# Patient Record
Sex: MALE | Race: WHITE | HISPANIC OR LATINO | Employment: FULL TIME | URBAN - METROPOLITAN AREA
[De-identification: names, ages, dates, MRNs, and addresses within clinical notes are randomized per-mention and may not be internally consistent; named-entity substitution may affect disease eponyms.]

---

## 2017-04-18 ENCOUNTER — ALLSCRIPTS OFFICE VISIT (OUTPATIENT)
Dept: OTHER | Facility: OTHER | Age: 57
End: 2017-04-18

## 2017-10-12 ENCOUNTER — GENERIC CONVERSION - ENCOUNTER (OUTPATIENT)
Dept: OTHER | Facility: OTHER | Age: 57
End: 2017-10-12

## 2018-01-22 VITALS
HEART RATE: 64 BPM | BODY MASS INDEX: 26.68 KG/M2 | DIASTOLIC BLOOD PRESSURE: 68 MMHG | SYSTOLIC BLOOD PRESSURE: 110 MMHG | WEIGHT: 197 LBS | HEIGHT: 72 IN

## 2018-01-22 VITALS
BODY MASS INDEX: 27.92 KG/M2 | SYSTOLIC BLOOD PRESSURE: 138 MMHG | HEIGHT: 70 IN | HEART RATE: 60 BPM | DIASTOLIC BLOOD PRESSURE: 84 MMHG | WEIGHT: 195 LBS

## 2018-04-12 ENCOUNTER — OFFICE VISIT (OUTPATIENT)
Dept: FAMILY MEDICINE CLINIC | Facility: CLINIC | Age: 58
End: 2018-04-12
Payer: COMMERCIAL

## 2018-04-12 VITALS
DIASTOLIC BLOOD PRESSURE: 86 MMHG | HEIGHT: 72 IN | HEART RATE: 83 BPM | WEIGHT: 199 LBS | BODY MASS INDEX: 26.95 KG/M2 | SYSTOLIC BLOOD PRESSURE: 126 MMHG

## 2018-04-12 DIAGNOSIS — Z02.89 ENCOUNTER FOR FEDERAL AVIATION ADMINISTRATION (FAA) EXAMINATION: Primary | ICD-10-CM

## 2018-04-12 PROCEDURE — 99499 UNLISTED E&M SERVICE: CPT | Performed by: FAMILY MEDICINE

## 2018-04-12 PROCEDURE — 93000 ELECTROCARDIOGRAM COMPLETE: CPT | Performed by: FAMILY MEDICINE

## 2018-04-12 PROCEDURE — 3008F BODY MASS INDEX DOCD: CPT | Performed by: FAMILY MEDICINE

## 2018-10-25 ENCOUNTER — OFFICE VISIT (OUTPATIENT)
Dept: FAMILY MEDICINE CLINIC | Facility: CLINIC | Age: 58
End: 2018-10-25
Payer: COMMERCIAL

## 2018-10-25 VITALS
SYSTOLIC BLOOD PRESSURE: 140 MMHG | BODY MASS INDEX: 26.11 KG/M2 | DIASTOLIC BLOOD PRESSURE: 90 MMHG | HEIGHT: 72 IN | HEART RATE: 69 BPM | WEIGHT: 192.8 LBS

## 2018-10-25 DIAGNOSIS — Z02.89 ENCOUNTER FOR FEDERAL AVIATION ADMINISTRATION (FAA) EXAMINATION: Primary | ICD-10-CM

## 2018-10-25 PROCEDURE — 3008F BODY MASS INDEX DOCD: CPT | Performed by: FAMILY MEDICINE

## 2018-10-25 PROCEDURE — 99499 UNLISTED E&M SERVICE: CPT | Performed by: FAMILY MEDICINE

## 2018-10-25 NOTE — PROGRESS NOTES
Chief Complaint   Patient presents with    Well Check     FAA 1st class, No ekg, No meds   Vision: 20/20 distance/intermediate, 20/30 near, PASS c/v  C#: [id number]

## 2019-04-11 ENCOUNTER — OFFICE VISIT (OUTPATIENT)
Dept: FAMILY MEDICINE CLINIC | Facility: CLINIC | Age: 59
End: 2019-04-11

## 2019-04-11 VITALS
HEIGHT: 72 IN | DIASTOLIC BLOOD PRESSURE: 82 MMHG | SYSTOLIC BLOOD PRESSURE: 138 MMHG | WEIGHT: 195 LBS | HEART RATE: 61 BPM | BODY MASS INDEX: 26.41 KG/M2

## 2019-04-11 DIAGNOSIS — Z02.89 ENCOUNTER FOR FEDERAL AVIATION ADMINISTRATION (FAA) EXAMINATION: Primary | ICD-10-CM

## 2019-04-11 PROCEDURE — 99499 UNLISTED E&M SERVICE: CPT | Performed by: FAMILY MEDICINE

## 2019-04-11 PROCEDURE — 93000 ELECTROCARDIOGRAM COMPLETE: CPT | Performed by: FAMILY MEDICINE

## 2019-10-23 ENCOUNTER — OFFICE VISIT (OUTPATIENT)
Dept: FAMILY MEDICINE CLINIC | Facility: CLINIC | Age: 59
End: 2019-10-23

## 2019-10-23 VITALS
HEART RATE: 73 BPM | HEIGHT: 72 IN | DIASTOLIC BLOOD PRESSURE: 70 MMHG | SYSTOLIC BLOOD PRESSURE: 112 MMHG | BODY MASS INDEX: 26.68 KG/M2 | WEIGHT: 197 LBS

## 2019-10-23 DIAGNOSIS — Z02.89 ENCOUNTER FOR FEDERAL AVIATION ADMINISTRATION (FAA) EXAMINATION: Primary | ICD-10-CM

## 2019-10-23 PROCEDURE — 99499 UNLISTED E&M SERVICE: CPT | Performed by: FAMILY MEDICINE

## 2020-06-10 ENCOUNTER — OFFICE VISIT (OUTPATIENT)
Dept: FAMILY MEDICINE CLINIC | Facility: CLINIC | Age: 60
End: 2020-06-10
Payer: COMMERCIAL

## 2020-06-10 VITALS
BODY MASS INDEX: 26.68 KG/M2 | WEIGHT: 197 LBS | HEIGHT: 72 IN | SYSTOLIC BLOOD PRESSURE: 120 MMHG | DIASTOLIC BLOOD PRESSURE: 80 MMHG | HEART RATE: 68 BPM

## 2020-06-10 DIAGNOSIS — Z02.89 ENCOUNTER FOR FEDERAL AVIATION ADMINISTRATION (FAA) EXAMINATION: Primary | ICD-10-CM

## 2020-06-10 PROCEDURE — 3008F BODY MASS INDEX DOCD: CPT | Performed by: FAMILY MEDICINE

## 2020-06-10 PROCEDURE — 99499 UNLISTED E&M SERVICE: CPT | Performed by: FAMILY MEDICINE

## 2020-06-10 PROCEDURE — 93000 ELECTROCARDIOGRAM COMPLETE: CPT | Performed by: FAMILY MEDICINE

## 2021-02-24 ENCOUNTER — OFFICE VISIT (OUTPATIENT)
Dept: FAMILY MEDICINE CLINIC | Facility: CLINIC | Age: 61
End: 2021-02-24

## 2021-02-24 VITALS
BODY MASS INDEX: 26.82 KG/M2 | HEIGHT: 72 IN | HEART RATE: 87 BPM | SYSTOLIC BLOOD PRESSURE: 138 MMHG | WEIGHT: 198 LBS | DIASTOLIC BLOOD PRESSURE: 80 MMHG

## 2021-02-24 DIAGNOSIS — Z02.89 ENCOUNTER FOR FEDERAL AVIATION ADMINISTRATION (FAA) EXAMINATION: Primary | ICD-10-CM

## 2021-02-24 PROCEDURE — 99499 UNLISTED E&M SERVICE: CPT | Performed by: FAMILY MEDICINE

## 2021-02-24 NOTE — PROGRESS NOTES
Chief Complaint   Patient presents with   Lillian Pozo Physical Exam     1st [address] # [id number]  ekg, no letter   no correctives

## 2021-08-10 ENCOUNTER — OFFICE VISIT (OUTPATIENT)
Dept: FAMILY MEDICINE CLINIC | Facility: CLINIC | Age: 61
End: 2021-08-10

## 2021-08-10 VITALS
HEART RATE: 65 BPM | BODY MASS INDEX: 26.01 KG/M2 | WEIGHT: 192 LBS | HEIGHT: 72 IN | SYSTOLIC BLOOD PRESSURE: 120 MMHG | DIASTOLIC BLOOD PRESSURE: 80 MMHG

## 2021-08-10 DIAGNOSIS — Z02.89 ENCOUNTER FOR FEDERAL AVIATION ADMINISTRATION (FAA) EXAMINATION: Primary | ICD-10-CM

## 2021-08-10 PROCEDURE — 93000 ELECTROCARDIOGRAM COMPLETE: CPT | Performed by: FAMILY MEDICINE

## 2021-08-10 PROCEDURE — 99499 UNLISTED E&M SERVICE: CPT | Performed by: FAMILY MEDICINE

## 2021-08-10 NOTE — PROGRESS NOTES
Chief Complaint   Patient presents with    Physical Exam     FAA exam- 1st class ekg correctives? no letter

## 2022-02-21 ENCOUNTER — OFFICE VISIT (OUTPATIENT)
Dept: FAMILY MEDICINE CLINIC | Facility: CLINIC | Age: 62
End: 2022-02-21

## 2022-02-21 VITALS
WEIGHT: 194 LBS | DIASTOLIC BLOOD PRESSURE: 80 MMHG | HEART RATE: 78 BPM | BODY MASS INDEX: 26.28 KG/M2 | SYSTOLIC BLOOD PRESSURE: 142 MMHG | HEIGHT: 72 IN

## 2022-02-21 DIAGNOSIS — Z02.89 ENCOUNTER FOR FEDERAL AVIATION ADMINISTRATION (FAA) EXAMINATION: Primary | ICD-10-CM

## 2022-02-21 PROCEDURE — 99499 UNLISTED E&M SERVICE: CPT | Performed by: FAMILY MEDICINE

## 2022-08-25 ENCOUNTER — OFFICE VISIT (OUTPATIENT)
Dept: FAMILY MEDICINE CLINIC | Facility: CLINIC | Age: 62
End: 2022-08-25

## 2022-08-25 VITALS — WEIGHT: 196 LBS | HEIGHT: 72 IN | HEART RATE: 67 BPM | BODY MASS INDEX: 26.55 KG/M2

## 2022-08-25 DIAGNOSIS — Z02.89 ENCOUNTER FOR FEDERAL AVIATION ADMINISTRATION (FAA) EXAMINATION: Primary | ICD-10-CM

## 2022-08-25 PROCEDURE — 93000 ELECTROCARDIOGRAM COMPLETE: CPT | Performed by: FAMILY MEDICINE

## 2022-08-25 PROCEDURE — 99499 UNLISTED E&M SERVICE: CPT | Performed by: FAMILY MEDICINE

## 2022-08-25 NOTE — PROGRESS NOTES
Chief Complaint   Patient presents with    FAA     1st class, ekg, no meds, no correctives, no cecilia

## 2023-02-23 ENCOUNTER — OFFICE VISIT (OUTPATIENT)
Dept: FAMILY MEDICINE CLINIC | Facility: CLINIC | Age: 63
End: 2023-02-23

## 2023-02-23 VITALS — DIASTOLIC BLOOD PRESSURE: 86 MMHG | HEART RATE: 76 BPM | SYSTOLIC BLOOD PRESSURE: 132 MMHG

## 2023-02-23 DIAGNOSIS — Z02.89 ENCOUNTER FOR FEDERAL AVIATION ADMINISTRATION (FAA) EXAMINATION: Primary | ICD-10-CM

## 2023-02-23 NOTE — PROGRESS NOTES
Chief Complaint   Patient presents with   • Physical Exam     FAA 1st class, no ekg, no correctives, no meds

## 2023-05-15 ENCOUNTER — OFFICE VISIT (OUTPATIENT)
Dept: INTERNAL MEDICINE CLINIC | Facility: CLINIC | Age: 63
End: 2023-05-15

## 2023-05-15 VITALS
BODY MASS INDEX: 27.52 KG/M2 | DIASTOLIC BLOOD PRESSURE: 74 MMHG | HEART RATE: 80 BPM | OXYGEN SATURATION: 98 % | TEMPERATURE: 98.2 F | WEIGHT: 196.6 LBS | HEIGHT: 71 IN | SYSTOLIC BLOOD PRESSURE: 128 MMHG

## 2023-05-15 DIAGNOSIS — J11.1 INFLUENZA: ICD-10-CM

## 2023-05-15 DIAGNOSIS — Z12.5 SCREENING FOR PROSTATE CANCER: ICD-10-CM

## 2023-05-15 DIAGNOSIS — Z13.29 SCREENING FOR HYPOTHYROIDISM: ICD-10-CM

## 2023-05-15 DIAGNOSIS — Z13.1 SCREENING FOR DIABETES MELLITUS: ICD-10-CM

## 2023-05-15 DIAGNOSIS — Z13.0 SCREENING FOR DEFICIENCY ANEMIA: ICD-10-CM

## 2023-05-15 DIAGNOSIS — Z13.5 SCREENING FOR GLAUCOMA: ICD-10-CM

## 2023-05-15 DIAGNOSIS — Z12.11 SCREENING FOR COLON CANCER: ICD-10-CM

## 2023-05-15 DIAGNOSIS — Z13.220 SCREENING FOR HYPERCHOLESTEROLEMIA: ICD-10-CM

## 2023-05-15 DIAGNOSIS — Z12.11 SCREEN FOR COLON CANCER: Primary | ICD-10-CM

## 2023-05-15 NOTE — PROGRESS NOTES
Assessment/Plan:    Problem List Items Addressed This Visit    None  Visit Diagnoses     Screen for colon cancer    -  Primary    Relevant Orders    Ambulatory referral for colonoscopy    Screening for diabetes mellitus        Relevant Orders    Comprehensive metabolic panel    Screening for hypercholesterolemia        Relevant Orders    Lipid Panel with Direct LDL reflex    Screening for prostate cancer        Relevant Orders    PSA, Total Screen    Screening for deficiency anemia        Relevant Orders    CBC and differential    Influenza        Screening for hypothyroidism        Relevant Orders    TSH, 3rd generation with Free T4 reflex    Screening for colon cancer        Screening for glaucoma        Relevant Orders    Ambulatory Referral to Ophthalmology           Diagnoses and all orders for this visit:    Screen for colon cancer  -     Ambulatory referral for colonoscopy; Future    Screening for diabetes mellitus  -     Comprehensive metabolic panel; Future    Screening for hypercholesterolemia  -     Lipid Panel with Direct LDL reflex; Future    Screening for prostate cancer  -     PSA, Total Screen; Future    Screening for deficiency anemia  -     CBC and differential; Future    Influenza    Screening for hypothyroidism  -     TSH, 3rd generation with Free T4 reflex; Future    Screening for colon cancer    Screening for glaucoma  -     Ambulatory Referral to Ophthalmology; Future      No problem-specific Assessment & Plan notes found for this encounter  Subjective:      Patient ID: Matteo Zavaleta is a 58 y o  male  No concerns at this time      The following portions of the patient's history were reviewed and updated as appropriate:   He has no past medical history on file  ,  does not have any pertinent problems on file  ,   has a past surgical history that includes TONSILECTOMY AND ADNOIDECTOMY  ,  family history includes No Known Problems in his mother  ,   reports that he has never smoked   He "has never used smokeless tobacco  He reports current alcohol use  He reports that he does not use drugs  ,  has No Known Allergies     No current outpatient medications on file  No current facility-administered medications for this visit  Review of Systems   Constitutional: Negative for chills, fatigue and fever  HENT: Negative  Respiratory: Negative for cough, chest tightness and shortness of breath  Cardiovascular: Negative for chest pain and palpitations  Gastrointestinal: Negative for abdominal pain, constipation, diarrhea, nausea and vomiting  Genitourinary: Negative  Musculoskeletal: Negative for back pain and myalgias  Skin: Negative  Neurological: Negative  Psychiatric/Behavioral: Negative for dysphoric mood  The patient is not nervous/anxious  Objective:  Vitals:    05/15/23 1430   BP: 128/74   Pulse: 80   Temp: 98 2 °F (36 8 °C)   SpO2: 98%   Weight: 89 2 kg (196 lb 9 6 oz)   Height: 5' 11 25\" (1 81 m)     Body mass index is 27 23 kg/m²  Physical Exam  Vitals and nursing note reviewed  Constitutional:       Appearance: He is well-developed  HENT:      Head: Normocephalic and atraumatic  Eyes:      Pupils: Pupils are equal, round, and reactive to light  Cardiovascular:      Rate and Rhythm: Normal rate and regular rhythm  Heart sounds: Normal heart sounds  No murmur heard  Pulmonary:      Effort: Pulmonary effort is normal       Breath sounds: Normal breath sounds  No stridor  No rales  Abdominal:      General: Bowel sounds are normal  There is no distension  Palpations: Abdomen is soft  Tenderness: There is no abdominal tenderness  Musculoskeletal:         General: No deformity  Normal range of motion  Cervical back: Normal range of motion and neck supple  Skin:     General: Skin is warm and dry  Neurological:      Mental Status: He is alert and oriented to person, place, and time            PHQ-2/9 Depression Screening  " Little interest or pleasure in doing things: 0 - not at all  Feeling down, depressed, or hopeless: 0 - not at all  PHQ-2 Score: 0  PHQ-2 Interpretation: Negative depression screen

## 2023-05-16 ENCOUNTER — APPOINTMENT (OUTPATIENT)
Dept: LAB | Facility: CLINIC | Age: 63
End: 2023-05-16

## 2023-05-16 DIAGNOSIS — Z13.1 SCREENING FOR DIABETES MELLITUS: ICD-10-CM

## 2023-05-16 DIAGNOSIS — Z13.29 SCREENING FOR HYPOTHYROIDISM: ICD-10-CM

## 2023-05-16 DIAGNOSIS — Z13.220 SCREENING FOR HYPERCHOLESTEROLEMIA: ICD-10-CM

## 2023-05-16 DIAGNOSIS — Z13.0 SCREENING FOR DEFICIENCY ANEMIA: ICD-10-CM

## 2023-05-16 DIAGNOSIS — Z12.5 SCREENING FOR PROSTATE CANCER: ICD-10-CM

## 2023-05-16 LAB
ALBUMIN SERPL BCP-MCNC: 3.9 G/DL (ref 3.5–5)
ALP SERPL-CCNC: 60 U/L (ref 46–116)
ALT SERPL W P-5'-P-CCNC: 30 U/L (ref 12–78)
ANION GAP SERPL CALCULATED.3IONS-SCNC: 2 MMOL/L (ref 4–13)
AST SERPL W P-5'-P-CCNC: 12 U/L (ref 5–45)
BASOPHILS # BLD AUTO: 0.04 THOUSANDS/ÂΜL (ref 0–0.1)
BASOPHILS NFR BLD AUTO: 1 % (ref 0–1)
BILIRUB SERPL-MCNC: 0.67 MG/DL (ref 0.2–1)
BUN SERPL-MCNC: 15 MG/DL (ref 5–25)
CALCIUM SERPL-MCNC: 9.1 MG/DL (ref 8.3–10.1)
CHLORIDE SERPL-SCNC: 109 MMOL/L (ref 96–108)
CHOLEST SERPL-MCNC: 178 MG/DL
CO2 SERPL-SCNC: 27 MMOL/L (ref 21–32)
CREAT SERPL-MCNC: 0.98 MG/DL (ref 0.6–1.3)
EOSINOPHIL # BLD AUTO: 0.25 THOUSAND/ÂΜL (ref 0–0.61)
EOSINOPHIL NFR BLD AUTO: 4 % (ref 0–6)
ERYTHROCYTE [DISTWIDTH] IN BLOOD BY AUTOMATED COUNT: 11.8 % (ref 11.6–15.1)
GFR SERPL CREATININE-BSD FRML MDRD: 82 ML/MIN/1.73SQ M
GLUCOSE P FAST SERPL-MCNC: 99 MG/DL (ref 65–99)
HCT VFR BLD AUTO: 44 % (ref 36.5–49.3)
HDLC SERPL-MCNC: 68 MG/DL
HGB BLD-MCNC: 15.8 G/DL (ref 12–17)
IMM GRANULOCYTES # BLD AUTO: 0.02 THOUSAND/UL (ref 0–0.2)
IMM GRANULOCYTES NFR BLD AUTO: 0 % (ref 0–2)
LDLC SERPL CALC-MCNC: 93 MG/DL (ref 0–100)
LYMPHOCYTES # BLD AUTO: 1.56 THOUSANDS/ÂΜL (ref 0.6–4.47)
LYMPHOCYTES NFR BLD AUTO: 27 % (ref 14–44)
MCH RBC QN AUTO: 33.3 PG (ref 26.8–34.3)
MCHC RBC AUTO-ENTMCNC: 35.9 G/DL (ref 31.4–37.4)
MCV RBC AUTO: 93 FL (ref 82–98)
MONOCYTES # BLD AUTO: 0.35 THOUSAND/ÂΜL (ref 0.17–1.22)
MONOCYTES NFR BLD AUTO: 6 % (ref 4–12)
NEUTROPHILS # BLD AUTO: 3.64 THOUSANDS/ÂΜL (ref 1.85–7.62)
NEUTS SEG NFR BLD AUTO: 62 % (ref 43–75)
NRBC BLD AUTO-RTO: 0 /100 WBCS
PLATELET # BLD AUTO: 223 THOUSANDS/UL (ref 149–390)
PMV BLD AUTO: 11.2 FL (ref 8.9–12.7)
POTASSIUM SERPL-SCNC: 3.8 MMOL/L (ref 3.5–5.3)
PROT SERPL-MCNC: 7.6 G/DL (ref 6.4–8.4)
PSA SERPL-MCNC: 0.56 NG/ML (ref 0–4)
RBC # BLD AUTO: 4.75 MILLION/UL (ref 3.88–5.62)
SODIUM SERPL-SCNC: 138 MMOL/L (ref 135–147)
TRIGL SERPL-MCNC: 85 MG/DL
TSH SERPL DL<=0.05 MIU/L-ACNC: 1.35 UIU/ML (ref 0.45–4.5)
WBC # BLD AUTO: 5.86 THOUSAND/UL (ref 4.31–10.16)

## 2023-05-17 ENCOUNTER — TELEPHONE (OUTPATIENT)
Dept: ADMINISTRATIVE | Facility: OTHER | Age: 63
End: 2023-05-17

## 2023-05-17 NOTE — TELEPHONE ENCOUNTER
Upon review of the In Basket request and the patient's chart, initial outreach has been made via fax to facility  Please see Contacts section for details       Thank you  Larissa Avila

## 2023-05-17 NOTE — TELEPHONE ENCOUNTER
----- Message from Javi Broderick sent at 5/15/2023  2:33 PM EDT -----  Regarding: COLONOSCOPY  05/15/23 2:33 PM    Hello, our patient Delorise Ear has had CRC: Colonoscopy completed/performed  Please assist in updating the patient chart by making an External outreach to [place] [address]  facility located in [place]  The date of service is UNSURE      Thank you,  Laverne Rehman [facility]

## 2023-05-17 NOTE — LETTER
Procedure Request Form: Colonoscopy      Date Requested: 23  Patient: Branch Goon  Patient : 1960   Referring Provider: Migel Swain, DO        Date of Procedure ______________________________       The above patient has informed us that they have completed their   most recent Colonoscopy at your facility  Please complete   this form and attach all corresponding procedure reports/results  Comments __________________________________________________________  ____________________________________________________________________  ____________________________________________________________________  ____________________________________________________________________    Facility Completing Procedure _________________________________________    Form Completed By (print name) _______________________________________      Signature __________________________________________________________      These reports are needed for  compliance  Please fax this completed form and a copy of the procedure report to our office located at [place] [address] as soon as possible to Fax [phone] jair Boyce: Phone [phone]    We thank you for your assistance in treating our mutual patient

## 2023-05-31 NOTE — TELEPHONE ENCOUNTER
Upon review of the In Basket request we have found as a result of outreach that patient did not have the requested item(s) completed  Per office, patient was not seen at their facility  Any additional questions or concerns should be emailed to the Practice Liaisons via the appropriate education email address, please do not reply via In Basket      Thank you  Yesi Hunt

## 2023-08-08 ENCOUNTER — OFFICE VISIT (OUTPATIENT)
Dept: URGENT CARE | Facility: CLINIC | Age: 63
End: 2023-08-08
Payer: COMMERCIAL

## 2023-08-08 VITALS
SYSTOLIC BLOOD PRESSURE: 154 MMHG | RESPIRATION RATE: 18 BRPM | OXYGEN SATURATION: 98 % | DIASTOLIC BLOOD PRESSURE: 87 MMHG | HEART RATE: 74 BPM | TEMPERATURE: 97.3 F

## 2023-08-08 DIAGNOSIS — H61.21 RIGHT EAR IMPACTED CERUMEN: Primary | ICD-10-CM

## 2023-08-08 DIAGNOSIS — M70.21 OLECRANON BURSITIS OF RIGHT ELBOW: ICD-10-CM

## 2023-08-08 DIAGNOSIS — H61.22 LEFT EAR IMPACTED CERUMEN: ICD-10-CM

## 2023-08-08 PROCEDURE — G0382 LEV 3 HOSP TYPE B ED VISIT: HCPCS | Performed by: NURSE PRACTITIONER

## 2023-08-08 PROCEDURE — 69210 REMOVE IMPACTED EAR WAX UNI: CPT | Performed by: NURSE PRACTITIONER

## 2023-08-08 NOTE — PROGRESS NOTES
[facility]        NAME: Sean Valladares is a 58 y.o. male  : 1960    MRN: [id number]  DATE: 2023  TIME: 11:06 AM      Assessment and Plan     Right ear impacted cerumen [H61.21]  1. Right ear impacted cerumen  Ear cerumen removal      2. Olecranon bursitis of right elbow        3. Left ear impacted cerumen  Ear cerumen removal        Ear cerumen removal    Date/Time: 2023 10:50 AM    Performed by: ALEKS Angelo  Authorized by: ALEKS Angelo  Universal Protocol:  Consent: Verbal consent obtained. Risks and benefits: risks, benefits and alternatives were discussed  Consent given by: patient  Time out: Immediately prior to procedure a "time out" was called to verify the correct patient, procedure, equipment, support staff and site/side marked as required. Timeout called at: 2023 10:50 AM.  Patient understanding: patient states understanding of the procedure being performed  Required items: required blood products, implants, devices, and special equipment available  Patient identity confirmed: verbally with patient      Patient location:  Clinic  Procedure details:     Local anesthetic:  None    Location:  L ear and R ear    Procedure type: irrigation with instrumentation      Procedure type comment:  Right ear irrigation with lighted curette. Left ear lighted curette only    Instrumentation: curette    Post-procedure details:     Complication:  None    Hearing quality:  Normal    Patient tolerance of procedure: Tolerated well, no immediate complications        Patient Instructions     Patient Instructions     Earwax Blockage   AMBULATORY CARE:   Earwax  can build up in your ear canal and cause a blockage. Earwax blockage happens when your ear makes earwax faster than your body can remove it.         Common symptoms include the following:   • Trouble hearing    • Earache    • Ear fullness or a feeling that something is plugging up your ear    • Itching or ringing in your ear    • Dizziness    Seed immediate care if:   • You feel dizzy. • You have discharge or blood coming out of your ear. • Your ear pain does not go away or gets worse. Call your doctor if:   • You have a fever. • You have trouble hearing or hear ringing noises. • You have questions or concerns about your condition or care. Treatment for earwax blockage:   • Medicines  placed in the ear canal can soften the earwax so it will come out. • Flushing your ear canal  with warm water may flush out the earwax. • Small medical tools  may be used to remove the earwax. How to prevent earwax blockage:  Do not stick anything into your ears to clean them. Use cotton swabs on the outside of your ear only. Ask your healthcare provider for more information on ways to prevent blockage. Follow up with your doctor as directed:  Write down your questions so you remember to ask them during your visits. © Copyright [facility] 2022 Information is for End User's use only and may not be sold, redistributed or otherwise used for commercial purposes. The above information is an  only. It is not intended as medical advice for individual conditions or treatments. Talk to your doctor, nurse or pharmacist before following any medical regimen to see if it is safe and effective for you. Elbow Bursitis   AMBULATORY CARE:   Elbow bursitis  is inflammation of the bursa in your elbow. The bursa is a fluid-filled sac that acts as a cushion between a bone and a tendon. A tendon is a cord of strong tissue that connects muscles to bones. The bursa is located right under the point of your elbow.        Common signs and symptoms of elbow bursitis:   • Pain or tenderness when you touch or move your elbow    • Redness or swelling on or around the point of your elbow    • Decreased movement of your elbow    • Warmth of the skin over your elbow    • A grating or grinding sound or feeling when you move your elbow    Call your doctor if:   • Your pain and swelling increase. • Your symptoms do not improve after 10 days of treatment. • You have a fever. • You have questions or concerns about your condition or care. Treatment  may include any of the following:  • Medicines:      ? NSAIDs , such as ibuprofen, help decrease swelling, pain, and fever. NSAIDs can cause stomach bleeding or kidney problems in certain people. If you take blood thinner medicine, always ask your healthcare provider if NSAIDs are safe for you. Always read the medicine label and follow directions. ? Aspirin  helps relieve pain and swelling. Take aspirin exactly as directed by your healthcare provider. ? Antibiotics  help fight an infection caused by bacteria. ? Steroids  help decrease pain and swelling. Steroid injections are given directly into the painful area. Steroid pills may be given for a short time to relieve acute pain. • Fluid aspiration  is a procedure to remove fluid from the area. This may be done before you have a steroid injection. • Surgery  may be needed to remove your bursa or part of your elbow bone. Surgery is only done when other treatments do not work. Manage elbow bursitis:   • Rest your elbow as much as possible to decrease pain and swelling. Slowly start to do more each day. Return to your daily activities as directed. Do not  bend your elbow all the way or lift anything heavy while your elbow is healing. An occupational therapist can help you find ways to keep your elbow rested that will help with the type of work you do. For example, arm rests that do not touch the elbow can make it easier to work at a computer. • Use an elbow pad while your elbow heals. An elbow pad will cushion and protect your elbow. Your healthcare provider can tell you the kind of elbow pad to use. He or she can also tell you how long to wear it each day, and for how many days to use it.     • Apply ice to help decrease swelling and pain. Use an ice pack, or put crushed ice in a plastic bag. Cover the bag with a towel before you place it on your elbow. Apply ice for 15 to 20 minutes, 3 to 4 times each day, as directed. • Use compression to help decrease swelling. Healthcare providers may wrap your arm with tape or an elastic bandage. Loosen the elastic bandage if you start to lose feeling in your fingers. • Elevate (raise) your elbow above the level of your heart as often as you can. This will help decrease swelling and pain. Prop your elbow on pillows or blankets to keep it elevated comfortably. • Go to physical therapy, if directed. A physical therapist teaches you exercises to help improve movement and strength, and to decrease pain. Prevent elbow bursitis:   • Avoid injury and pressure to your elbows. Wear elbow pads or protectors when you play sports. Do not lean on your elbows or clench your fists. Do not tightly  small items, such as tools or pens. • Stretch, warm up, and cool down. Always stretch and do warmup and cool-down exercises before and after you exercise. This will help loosen your muscles and decrease stress on your elbow. Rest between workouts. Follow up with your doctor as directed:  Write down your questions so you remember to ask them during your visits. © Copyright [employer] 2022 Information is for End User's use only and may not be sold, redistributed or otherwise used for commercial purposes. The above information is an  only. It is not intended as medical advice for individual conditions or treatments. Talk to your doctor, nurse or pharmacist before following any medical regimen to see if it is safe and effective for you. Elbow Bursitis Exercises   WHAT YOU NEED TO KNOW:   What do I need to know about elbow bursitis exercises? Elbow bursitis exercises help decrease pain and swelling.  They also help increase the movement and strength of your elbow. You will need to start with range-of-motion exercises. When you can do these exercises without pain, you will move to strength exercises. Your healthcare provider or physical therapist will show you how to do movement and strength exercises. He or she will tell you how often to do the exercises. Which exercises increase range of motion? • Finger extensions:  Hold the fingertips of your injured arm close together with your fingers and thumb straight. Put a rubber band around the outside of your fingertips and thumb. Spread your fingers apart and then slowly bring them together without letting the rubber band fall off. Repeat 40 times. • :  Hold a soft rubber ball or tennis ball in your hand. Squeeze the ball as hard as you can and hold this position. Ask your healthcare provider how long to hold this position. Repeat this exercise as directed by your healthcare provider. • Wrist flexor stretch:  Hold your arm straight out in front of you with your palm facing down. Use your other hand to grasp your fingers. Keep your elbow straight and slowly bend your hand back. Your fingertips should point up and your palm should face away from you. Do this until you feel a stretch in the top of your wrist. Hold for 10 seconds. Repeat 5 times. • Wrist extensor stretch: This stretch is the opposite of the wrist flexor stretch. Hold your arm straight out in front of you with your palm facing down. Use your other hand to grasp your fingers. Keep your elbow straight and slowly bend your hand down. Your fingertips should point down and your palm should face you. Do this until you feel a stretch in your wrist. Hold for 10 seconds. Repeat 5 times. • Elbow flexor stretch:  Bend your elbow, and keep your palm facing toward you. Your healthcare provider or physical therapist will tell you how far to bend your elbow. You may injure your elbow if you fully bend it too early.  Use your other hand to press gently on the back of your forearm so your arm moves toward you. Do this until you feel a stretch in the back of your upper arm. Hold for 15 to 30 seconds. Repeat 5 times. • Elbow extension stretch: This exercise is the opposite of the elbow flexor stretch. Sit in a chair with your arm resting on your thigh. Hold your wrist with the other hand. Slowly straighten your arm so it is extended as far as possible. Keep holding your wrist as you move your arm slowly back to the starting position. Repeat 5 times. Which exercises increase strength? Your healthcare provider or physical therapist will tell you how much weight to use. • Wrist curls:  Sit in a chair with your forearm resting on your thigh or on a table. Hold a dumbbell with your palm facing up. Bend your wrist up and then slowly lower it down. Repeat 20 times. • Forearm rotation:  Sit in a chair with your forearm resting on your thigh or on a table. Hold a dumbbell with your palm facing up. Slowly turn your forearm until your palm faces down. Then slowly return to the starting position. Repeat 20 times. • Biceps curls:  Place your hand under your injured elbow for support. Turn your palm so that it faces up and hold a weight in your hand. Slowly bend and straighten your elbow. Repeat 30 times. When should I call my doctor or physical therapist?   • You have a fever or chills. • The skin around your elbow is red or warm. • Your pain and swelling increase. • Your symptoms do not improve after 10 days of treatment. • You have questions or concerns about elbow bursitis exercises. CARE AGREEMENT:   You have the right to help plan your care. Learn about your health condition and how it may be treated. Discuss treatment options with your healthcare providers to decide what care you want to receive. You always have the right to refuse treatment. The above information is an  only.  It is not intended as medical advice for individual conditions or treatments. Talk to your doctor, nurse or pharmacist before following any medical regimen to see if it is safe and effective for you. © Copyright [employer] Heather 2022 Information is for End User's use only and may not be sold, redistributed or otherwise used for commercial purposes. Follow up with PCP in 3-5 days. Proceed to  ER if symptoms worsen. Chief Complaint     Chief Complaint   Patient presents with   • Earache     Right ear, Started at the beginning of august         History of Present Illness     Patient notes right ear discomfort/pressure x 1 week with decreased hearing. Thought perhaps swimmer's ear and has been using otc swimmer's ear drops without improvement. Mentions usually keeping ears clean/occasional Q -tip use. States problem started after swimming--used a piece of tissue to try to dry out his ears and felt inward movement/onset of pressure and the decreased hearing. Left ear with mild fullness but not as bothersome as the right. Also mentions slipping and hitting right elbow about a month ago on wet rocks. Has a big swollen lump over elbow. No pain with movement or use, just very mild pressure from the lump. No redness, heat, drainage. Review of Systems     Review of Systems   HENT: Positive for ear pain and hearing loss. Negative for ear discharge. Musculoskeletal: Positive for joint swelling. Negative for arthralgias. All other systems reviewed and are negative. Current Medications     No current outpatient medications on file. Current Allergies     Allergies as of 08/08/2023   • (No Known Allergies)              The following portions of the patient's history were reviewed and updated as appropriate: allergies, current medications, past family history, past medical history, past social history, past surgical history and problem list.     History reviewed. No pertinent past medical history.     Past Surgical History:   Procedure Laterality Date   • TONSILECTOMY AND ADNOIDECTOMY         Family History   Problem Relation Age of Onset   • No Known Problems Mother          Medications have been verified. Objective     /87   Pulse 74   Temp (!) 97.3 °F (36.3 °C)   Resp 18   SpO2 98%   No LMP for male patient. Physical Exam     Physical Exam  Vitals and nursing note reviewed. Constitutional:       General: He is not in acute distress. Appearance: Normal appearance. He is well-developed. He is not ill-appearing, toxic-appearing or diaphoretic. HENT:      Head: Normocephalic and atraumatic. Right Ear: Decreased hearing noted. Tenderness present. No drainage or swelling. There is impacted cerumen. No mastoid tenderness. Left Ear: No decreased hearing noted. Tenderness (slight) present. There is impacted cerumen (partial). No mastoid tenderness. Ears:      Comments: After successful wax removal, both ears WNL with normal hearing. Eyes:      Pupils: Pupils are equal, round, and reactive to light. Pulmonary:      Effort: Pulmonary effort is normal. No respiratory distress. Abdominal:      General: There is no distension. Palpations: Abdomen is soft. Musculoskeletal:         General: Swelling present. Normal range of motion. Right elbow: Effusion present. No tenderness. Cervical back: Normal range of motion and neck supple. Comments: Olecranon bursitis. No bony tenderness. Normal strength and ROM. No erythema, warmth, drainage. Skin:     General: Skin is warm and dry. Capillary Refill: Capillary refill takes less than 2 seconds. Neurological:      Mental Status: He is alert and oriented to person, place, and time. Psychiatric:         Behavior: Behavior normal.         Thought Content:  Thought content normal.         Judgment: Judgment normal.

## 2023-08-08 NOTE — PATIENT INSTRUCTIONS
Earwax Blockage   AMBULATORY CARE:   Earwax  can build up in your ear canal and cause a blockage. Earwax blockage happens when your ear makes earwax faster than your body can remove it. Common symptoms include the following:   Trouble hearing    Earache    Ear fullness or a feeling that something is plugging up your ear    Itching or ringing in your ear    Dizziness    Seed immediate care if:   You feel dizzy. You have discharge or blood coming out of your ear. Your ear pain does not go away or gets worse. Call your doctor if:   You have a fever. You have trouble hearing or hear ringing noises. You have questions or concerns about your condition or care. Treatment for earwax blockage:   Medicines  placed in the ear canal can soften the earwax so it will come out. Flushing your ear canal  with warm water may flush out the earwax. Small medical tools  may be used to remove the earwax. How to prevent earwax blockage:  Do not stick anything into your ears to clean them. Use cotton swabs on the outside of your ear only. Ask your healthcare provider for more information on ways to prevent blockage. Follow up with your doctor as directed:  Write down your questions so you remember to ask them during your visits. © Copyright [employer] 2022 Information is for End User's use only and may not be sold, redistributed or otherwise used for commercial purposes. The above information is an  only. It is not intended as medical advice for individual conditions or treatments. Talk to your doctor, nurse or pharmacist before following any medical regimen to see if it is safe and effective for you. Elbow Bursitis   AMBULATORY CARE:   Elbow bursitis  is inflammation of the bursa in your elbow. The bursa is a fluid-filled sac that acts as a cushion between a bone and a tendon. A tendon is a cord of strong tissue that connects muscles to bones.  The bursa is located right under the point of your elbow. Common signs and symptoms of elbow bursitis:   Pain or tenderness when you touch or move your elbow    Redness or swelling on or around the point of your elbow    Decreased movement of your elbow    Warmth of the skin over your elbow    A grating or grinding sound or feeling when you move your elbow    Call your doctor if:   Your pain and swelling increase. Your symptoms do not improve after 10 days of treatment. You have a fever. You have questions or concerns about your condition or care. Treatment  may include any of the following:  Medicines:      NSAIDs , such as ibuprofen, help decrease swelling, pain, and fever. NSAIDs can cause stomach bleeding or kidney problems in certain people. If you take blood thinner medicine, always ask your healthcare provider if NSAIDs are safe for you. Always read the medicine label and follow directions. Aspirin  helps relieve pain and swelling. Take aspirin exactly as directed by your healthcare provider. Antibiotics  help fight an infection caused by bacteria. Steroids  help decrease pain and swelling. Steroid injections are given directly into the painful area. Steroid pills may be given for a short time to relieve acute pain. Fluid aspiration  is a procedure to remove fluid from the area. This may be done before you have a steroid injection. Surgery  may be needed to remove your bursa or part of your elbow bone. Surgery is only done when other treatments do not work. Manage elbow bursitis:   Rest your elbow as much as possible to decrease pain and swelling. Slowly start to do more each day. Return to your daily activities as directed. Do not  bend your elbow all the way or lift anything heavy while your elbow is healing. An occupational therapist can help you find ways to keep your elbow rested that will help with the type of work you do.  For example, arm rests that do not touch the elbow can make it easier to work at a computer. Use an elbow pad while your elbow heals. An elbow pad will cushion and protect your elbow. Your healthcare provider can tell you the kind of elbow pad to use. He or she can also tell you how long to wear it each day, and for how many days to use it. Apply ice to help decrease swelling and pain. Use an ice pack, or put crushed ice in a plastic bag. Cover the bag with a towel before you place it on your elbow. Apply ice for 15 to 20 minutes, 3 to 4 times each day, as directed. Use compression to help decrease swelling. Healthcare providers may wrap your arm with tape or an elastic bandage. Loosen the elastic bandage if you start to lose feeling in your fingers. Elevate (raise) your elbow above the level of your heart as often as you can. This will help decrease swelling and pain. Prop your elbow on pillows or blankets to keep it elevated comfortably. Go to physical therapy, if directed. A physical therapist teaches you exercises to help improve movement and strength, and to decrease pain. Prevent elbow bursitis:   Avoid injury and pressure to your elbows. Wear elbow pads or protectors when you play sports. Do not lean on your elbows or clench your fists. Do not tightly  small items, such as tools or pens. Stretch, warm up, and cool down. Always stretch and do warmup and cool-down exercises before and after you exercise. This will help loosen your muscles and decrease stress on your elbow. Rest between workouts. Follow up with your doctor as directed:  Write down your questions so you remember to ask them during your visits. © Copyright Lovetta Inch 2022 Information is for End User's use only and may not be sold, redistributed or otherwise used for commercial purposes. The above information is an  only. It is not intended as medical advice for individual conditions or treatments.  Talk to your doctor, nurse or pharmacist before following any medical regimen to see if it is safe and effective for you. Elbow Bursitis Exercises   WHAT YOU NEED TO KNOW:   What do I need to know about elbow bursitis exercises? Elbow bursitis exercises help decrease pain and swelling. They also help increase the movement and strength of your elbow. You will need to start with range-of-motion exercises. When you can do these exercises without pain, you will move to strength exercises. Your healthcare provider or physical therapist will show you how to do movement and strength exercises. He or she will tell you how often to do the exercises. Which exercises increase range of motion? Finger extensions:  Hold the fingertips of your injured arm close together with your fingers and thumb straight. Put a rubber band around the outside of your fingertips and thumb. Spread your fingers apart and then slowly bring them together without letting the rubber band fall off. Repeat 40 times. :  Hold a soft rubber ball or tennis ball in your hand. Squeeze the ball as hard as you can and hold this position. Ask your healthcare provider how long to hold this position. Repeat this exercise as directed by your healthcare provider. Wrist flexor stretch:  Hold your arm straight out in front of you with your palm facing down. Use your other hand to grasp your fingers. Keep your elbow straight and slowly bend your hand back. Your fingertips should point up and your palm should face away from you. Do this until you feel a stretch in the top of your wrist. Hold for 10 seconds. Repeat 5 times. Wrist extensor stretch: This stretch is the opposite of the wrist flexor stretch. Hold your arm straight out in front of you with your palm facing down. Use your other hand to grasp your fingers. Keep your elbow straight and slowly bend your hand down. Your fingertips should point down and your palm should face you. Do this until you feel a stretch in your wrist. Hold for 10 seconds.  Repeat 5 times. Elbow flexor stretch:  Bend your elbow, and keep your palm facing toward you. Your healthcare provider or physical therapist will tell you how far to bend your elbow. You may injure your elbow if you fully bend it too early. Use your other hand to press gently on the back of your forearm so your arm moves toward you. Do this until you feel a stretch in the back of your upper arm. Hold for 15 to 30 seconds. Repeat 5 times. Elbow extension stretch: This exercise is the opposite of the elbow flexor stretch. Sit in a chair with your arm resting on your thigh. Hold your wrist with the other hand. Slowly straighten your arm so it is extended as far as possible. Keep holding your wrist as you move your arm slowly back to the starting position. Repeat 5 times. Which exercises increase strength? Your healthcare provider or physical therapist will tell you how much weight to use. Wrist curls:  Sit in a chair with your forearm resting on your thigh or on a table. Hold a dumbbell with your palm facing up. Bend your wrist up and then slowly lower it down. Repeat 20 times. Forearm rotation:  Sit in a chair with your forearm resting on your thigh or on a table. Hold a dumbbell with your palm facing up. Slowly turn your forearm until your palm faces down. Then slowly return to the starting position. Repeat 20 times. Biceps curls:  Place your hand under your injured elbow for support. Turn your palm so that it faces up and hold a weight in your hand. Slowly bend and straighten your elbow. Repeat 30 times. When should I call my doctor or physical therapist?   You have a fever or chills. The skin around your elbow is red or warm. Your pain and swelling increase. Your symptoms do not improve after 10 days of treatment. You have questions or concerns about elbow bursitis exercises. CARE AGREEMENT:   You have the right to help plan your care.  Learn about your health condition and how it may be treated. Discuss treatment options with your healthcare providers to decide what care you want to receive. You always have the right to refuse treatment. The above information is an  only. It is not intended as medical advice for individual conditions or treatments. Talk to your doctor, nurse or pharmacist before following any medical regimen to see if it is safe and effective for you. © Copyright [facility] 2022 Information is for End User's use only and may not be sold, redistributed or otherwise used for commercial purposes.

## 2023-08-09 ENCOUNTER — OFFICE VISIT (OUTPATIENT)
Dept: FAMILY MEDICINE CLINIC | Facility: CLINIC | Age: 63
End: 2023-08-09

## 2023-08-09 VITALS
SYSTOLIC BLOOD PRESSURE: 136 MMHG | WEIGHT: 199 LBS | HEART RATE: 64 BPM | HEIGHT: 72 IN | DIASTOLIC BLOOD PRESSURE: 84 MMHG | BODY MASS INDEX: 26.95 KG/M2

## 2023-08-09 DIAGNOSIS — Z02.89 ENCOUNTER FOR FEDERAL AVIATION ADMINISTRATION (FAA) EXAMINATION: Primary | ICD-10-CM

## 2023-08-09 PROCEDURE — 99499 UNLISTED E&M SERVICE: CPT | Performed by: FAMILY MEDICINE

## 2023-08-09 PROCEDURE — 93000 ELECTROCARDIOGRAM COMPLETE: CPT | Performed by: FAMILY MEDICINE

## 2023-08-09 NOTE — PROGRESS NOTES
Chief Complaint   Patient presents with   • Physical Exam     FAA 1st class, ekg, no correctives, no meds

## 2024-02-12 ENCOUNTER — TELEPHONE (OUTPATIENT)
Age: 64
End: 2024-02-12

## 2024-02-12 NOTE — TELEPHONE ENCOUNTER
Patient notified and scheduled. Advised to bring 8500 sheet and confirmation number at the time of visit. Patient verbalized understanding.

## 2024-02-16 RX ORDER — AMOXICILLIN 500 MG/1
CAPSULE ORAL
COMMUNITY
Start: 2024-01-15

## 2024-02-20 ENCOUNTER — OFFICE VISIT (OUTPATIENT)
Dept: FAMILY MEDICINE CLINIC | Facility: CLINIC | Age: 64
End: 2024-02-20

## 2024-02-20 VITALS
HEART RATE: 60 BPM | DIASTOLIC BLOOD PRESSURE: 70 MMHG | WEIGHT: 193 LBS | HEIGHT: 72 IN | SYSTOLIC BLOOD PRESSURE: 120 MMHG | BODY MASS INDEX: 26.14 KG/M2

## 2024-02-20 DIAGNOSIS — Z02.89 ENCOUNTER FOR FEDERAL AVIATION ADMINISTRATION (FAA) EXAMINATION: Primary | ICD-10-CM

## 2024-02-20 PROCEDURE — 99499 UNLISTED E&M SERVICE: CPT | Performed by: FAMILY MEDICINE

## 2024-02-20 NOTE — PROGRESS NOTES
Chief Complaint   Patient presents with   • Physical Exam     FAA 1st class, no correctives, no EKG

## 2024-08-06 ENCOUNTER — RA CDI HCC (OUTPATIENT)
Dept: OTHER | Facility: HOSPITAL | Age: 64
End: 2024-08-06

## 2024-08-12 ENCOUNTER — OFFICE VISIT (OUTPATIENT)
Dept: FAMILY MEDICINE CLINIC | Facility: CLINIC | Age: 64
End: 2024-08-12

## 2024-08-12 VITALS
WEIGHT: 194 LBS | DIASTOLIC BLOOD PRESSURE: 88 MMHG | HEART RATE: 83 BPM | SYSTOLIC BLOOD PRESSURE: 138 MMHG | BODY MASS INDEX: 26.28 KG/M2 | HEIGHT: 72 IN

## 2024-08-12 DIAGNOSIS — Z02.89 ENCOUNTER FOR FEDERAL AVIATION ADMINISTRATION (FAA) EXAMINATION: Primary | ICD-10-CM

## 2024-08-12 PROCEDURE — 93000 ELECTROCARDIOGRAM COMPLETE: CPT | Performed by: FAMILY MEDICINE

## 2024-08-12 PROCEDURE — 99499 UNLISTED E&M SERVICE: CPT | Performed by: FAMILY MEDICINE

## 2024-08-12 NOTE — PROGRESS NOTES
Chief Complaint   Patient presents with   • Physical Exam     FAA 1st class, EKG, no correctives

## 2024-09-24 PROCEDURE — 88305 TISSUE EXAM BY PATHOLOGIST: CPT | Performed by: PATHOLOGY

## 2024-09-25 ENCOUNTER — LAB REQUISITION (OUTPATIENT)
Dept: LAB | Facility: HOSPITAL | Age: 64
End: 2024-09-25
Payer: COMMERCIAL

## 2024-09-25 DIAGNOSIS — Z12.11 ENCOUNTER FOR SCREENING FOR MALIGNANT NEOPLASM OF COLON: ICD-10-CM

## 2024-09-25 DIAGNOSIS — K63.5 POLYP OF COLON: ICD-10-CM

## 2025-01-04 ENCOUNTER — APPOINTMENT (OUTPATIENT)
Dept: RADIOLOGY | Facility: CLINIC | Age: 65
End: 2025-01-04
Payer: COMMERCIAL

## 2025-01-04 VITALS
HEART RATE: 73 BPM | WEIGHT: 193.6 LBS | HEIGHT: 72 IN | TEMPERATURE: 98.4 F | BODY MASS INDEX: 26.22 KG/M2 | OXYGEN SATURATION: 98 %

## 2025-01-04 DIAGNOSIS — M25.512 CHRONIC LEFT SHOULDER PAIN: ICD-10-CM

## 2025-01-04 DIAGNOSIS — G89.29 CHRONIC LEFT SHOULDER PAIN: ICD-10-CM

## 2025-01-04 DIAGNOSIS — S46.012A ROTATOR CUFF STRAIN, LEFT, INITIAL ENCOUNTER: Primary | ICD-10-CM

## 2025-01-04 PROCEDURE — 20610 DRAIN/INJ JOINT/BURSA W/O US: CPT | Performed by: FAMILY MEDICINE

## 2025-01-04 PROCEDURE — 99204 OFFICE O/P NEW MOD 45 MIN: CPT | Performed by: FAMILY MEDICINE

## 2025-01-04 PROCEDURE — 73030 X-RAY EXAM OF SHOULDER: CPT

## 2025-01-04 RX ORDER — TRIAMCINOLONE ACETONIDE 40 MG/ML
40 INJECTION, SUSPENSION INTRA-ARTICULAR; INTRAMUSCULAR
Status: COMPLETED | OUTPATIENT
Start: 2025-01-04 | End: 2025-01-04

## 2025-01-04 RX ORDER — LIDOCAINE HYDROCHLORIDE 10 MG/ML
4 INJECTION, SOLUTION INFILTRATION; PERINEURAL
Status: COMPLETED | OUTPATIENT
Start: 2025-01-04 | End: 2025-01-04

## 2025-01-04 RX ADMIN — LIDOCAINE HYDROCHLORIDE 4 ML: 10 INJECTION, SOLUTION INFILTRATION; PERINEURAL at 11:00

## 2025-01-04 RX ADMIN — TRIAMCINOLONE ACETONIDE 40 MG: 40 INJECTION, SUSPENSION INTRA-ARTICULAR; INTRAMUSCULAR at 11:00

## 2025-01-04 NOTE — PATIENT INSTRUCTIONS
F/u 6 wks  Begin physical therapy  Icing/heat/OTC pain meds as needed.  L shoulder SA steroid injection.  Home exercises

## 2025-01-04 NOTE — PROGRESS NOTES
[facility] [address]  [facility] [id number] [phone]      Assessment:  1. Rotator cuff strain, left, initial encounter  -     Ambulatory Referral to Physical Therapy; Future  2. Chronic left shoulder pain  -     XR shoulder 2+ vw left; Future; Expected date: 01/04/2025  -     Ambulatory Referral to Physical Therapy; Future      Plan:  Patient Instructions   F/u 6 wks  Begin physical therapy  Icing/heat/OTC pain meds as needed.  L shoulder SA steroid injection.  Home exercises   Return in about 6 weeks (around 2/15/2025) for Recheck.    Chief Complaint:  Chief Complaint   Patient presents with    Left Arm - Pain     Pain in left arm.- Radiates from shoulder down to arm       Subjective:   HPI    Patient ID: Ming Brink is a 64 y.o. male     Here c/o L shoulder pain  Pain started in 10/24- lifting a heavy leaf blower into pick- up and felt pain with the last push  Sharp pain  Pain with driving/laying on r side  Pain with movement  No pain meds      Review of Systems   Constitutional:  Negative for fatigue and fever.   Respiratory:  Negative for shortness of breath.    Cardiovascular:  Negative for chest pain.   Gastrointestinal:  Negative for abdominal pain and nausea.   Genitourinary:  Negative for dysuria.   Musculoskeletal:  Positive for arthralgias.   Skin:  Negative for rash and wound.   Neurological:  Negative for weakness and headaches.       Objective:  Vitals:  Pulse 73   Temp 98.4 °F (36.9 °C) (Tympanic)   Ht 6' (1.829 m)   Wt 87.8 kg (193 lb 9.6 oz)   SpO2 98%   BMI 26.26 kg/m²     The following portions of the patient's history were reviewed and updated as appropriate: allergies, current medications, past family history, past medical history, past social history, past surgical history, and problem list.    Physical exam:  Physical Exam  Constitutional:       Appearance: Normal appearance. He is normal weight.   Eyes:  "     Extraocular Movements: Extraocular movements intact.   Pulmonary:      Effort: Pulmonary effort is normal.   Musculoskeletal:      Cervical back: Normal range of motion.   Skin:     General: Skin is warm and dry.   Neurological:      General: No focal deficit present.      Mental Status: He is alert and oriented to person, place, and time. Mental status is at baseline.   Psychiatric:         Mood and Affect: Mood normal.         Behavior: Behavior normal.         Thought Content: Thought content normal.         Judgment: Judgment normal.       Left Shoulder Exam     Tenderness   The patient is experiencing tenderness in the acromion.    Range of Motion   The patient has normal left shoulder ROM.    Muscle Strength   The patient has normal left shoulder strength.    Tests   Valenzuela test: positive  Impingement: positive         Large joint arthrocentesis: L subacromial bursa  [place] Protocol:  procedure performed by consultantConsent: Verbal consent obtained.  Risks and benefits: risks, benefits and alternatives were discussed  Consent given by: patient  Time out: Immediately prior to procedure a \"time out\" was called to verify the correct patient, procedure, equipment, support staff and site/side marked as required.  Timeout called at: 1/4/2025 11:17 AM.  Site marked: the operative site was marked  Supporting Documentation  Indications: pain   Procedure Details  Location: shoulder - L subacromial bursa  Preparation: Patient was prepped and draped in the usual sterile fashion  Needle size: 25 G  Ultrasound guidance: no  Approach: posterolateral  Medications administered: 40 mg triamcinolone acetonide 40 mg/mL; 4 mL lidocaine 1 %    Patient tolerance: patient tolerated the procedure well with no immediate complications  Dressing:  Sterile dressing applied            I have personally reviewed pertinent films in PACS and my interpretation is XR L shoulder no fx.      Jamie Fuentes MD   "

## 2025-02-19 ENCOUNTER — OFFICE VISIT (OUTPATIENT)
Age: 65
End: 2025-02-19

## 2025-02-19 VITALS
HEIGHT: 72 IN | BODY MASS INDEX: 25.87 KG/M2 | SYSTOLIC BLOOD PRESSURE: 130 MMHG | HEART RATE: 84 BPM | WEIGHT: 191 LBS | DIASTOLIC BLOOD PRESSURE: 90 MMHG

## 2025-02-19 DIAGNOSIS — Z02.89 ENCOUNTER FOR FEDERAL AVIATION ADMINISTRATION (FAA) EXAMINATION: Primary | ICD-10-CM

## 2025-02-19 PROCEDURE — 99499 UNLISTED E&M SERVICE: CPT | Performed by: FAMILY MEDICINE
